# Patient Record
Sex: FEMALE
[De-identification: names, ages, dates, MRNs, and addresses within clinical notes are randomized per-mention and may not be internally consistent; named-entity substitution may affect disease eponyms.]

---

## 2018-01-17 ENCOUNTER — HOSPITAL ENCOUNTER (INPATIENT)
Dept: HOSPITAL 31 - C.ER | Age: 28
LOS: 1 days | Discharge: HOME | DRG: 814 | End: 2018-01-18
Attending: SURGERY | Admitting: SURGERY
Payer: COMMERCIAL

## 2018-01-17 VITALS — RESPIRATION RATE: 20 BRPM

## 2018-01-17 DIAGNOSIS — R10.31: Primary | ICD-10-CM

## 2018-01-17 LAB
ALBUMIN SERPL-MCNC: 3.9 G/DL (ref 3.5–5)
ALBUMIN SERPL-MCNC: 4.2 G/DL (ref 3.5–5)
ALBUMIN/GLOB SERPL: 1.4 {RATIO} (ref 1–2.1)
ALBUMIN/GLOB SERPL: 1.5 {RATIO} (ref 1–2.1)
ALT SERPL-CCNC: 25 U/L (ref 9–52)
ALT SERPL-CCNC: 31 U/L (ref 9–52)
AST SERPL-CCNC: 37 U/L (ref 14–36)
AST SERPL-CCNC: 38 U/L (ref 14–36)
BASOPHILS # BLD AUTO: 0 K/UL (ref 0–0.2)
BASOPHILS # BLD AUTO: 0.1 K/UL (ref 0–0.2)
BASOPHILS NFR BLD: 0.4 % (ref 0–2)
BASOPHILS NFR BLD: 0.6 % (ref 0–2)
BILIRUB UR-MCNC: NEGATIVE MG/DL
BUN SERPL-MCNC: 11 MG/DL (ref 7–17)
BUN SERPL-MCNC: 12 MG/DL (ref 7–17)
CALCIUM SERPL-MCNC: 8.7 MG/DL (ref 8.6–10.4)
CALCIUM SERPL-MCNC: 8.9 MG/DL (ref 8.6–10.4)
EOSINOPHIL # BLD AUTO: 0.4 K/UL (ref 0–0.7)
EOSINOPHIL # BLD AUTO: 0.4 K/UL (ref 0–0.7)
EOSINOPHIL NFR BLD: 4 % (ref 0–4)
EOSINOPHIL NFR BLD: 4.6 % (ref 0–4)
ERYTHROCYTE [DISTWIDTH] IN BLOOD BY AUTOMATED COUNT: 13.3 % (ref 11.5–14.5)
ERYTHROCYTE [DISTWIDTH] IN BLOOD BY AUTOMATED COUNT: 13.4 % (ref 11.5–14.5)
GFR NON-AFRICAN AMERICAN: > 60
GFR NON-AFRICAN AMERICAN: > 60
GLUCOSE UR STRIP-MCNC: NORMAL MG/DL
HCG,QUALITATIVE URINE: NEGATIVE
HGB BLD-MCNC: 13.1 G/DL (ref 11–16)
HGB BLD-MCNC: 13.3 G/DL (ref 11–16)
LEUKOCYTE ESTERASE UR-ACNC: (no result) LEU/UL
LIPASE: 185 U/L (ref 23–300)
LYMPHOCYTES # BLD AUTO: 2.5 K/UL (ref 1–4.3)
LYMPHOCYTES # BLD AUTO: 2.6 K/UL (ref 1–4.3)
LYMPHOCYTES NFR BLD AUTO: 27.7 % (ref 20–40)
LYMPHOCYTES NFR BLD AUTO: 28.4 % (ref 20–40)
MCH RBC QN AUTO: 28.7 PG (ref 27–31)
MCH RBC QN AUTO: 29 PG (ref 27–31)
MCHC RBC AUTO-ENTMCNC: 34.1 G/DL (ref 33–37)
MCHC RBC AUTO-ENTMCNC: 34.5 G/DL (ref 33–37)
MCV RBC AUTO: 84.1 FL (ref 81–99)
MCV RBC AUTO: 84.2 FL (ref 81–99)
MONOCYTES # BLD: 0.8 K/UL (ref 0–0.8)
MONOCYTES # BLD: 0.8 K/UL (ref 0–0.8)
MONOCYTES NFR BLD: 8.3 % (ref 0–10)
MONOCYTES NFR BLD: 8.7 % (ref 0–10)
NEUTROPHILS # BLD: 5.1 K/UL (ref 1.8–7)
NEUTROPHILS # BLD: 5.6 K/UL (ref 1.8–7)
NEUTROPHILS NFR BLD AUTO: 58.3 % (ref 50–75)
NEUTROPHILS NFR BLD AUTO: 59 % (ref 50–75)
NRBC BLD AUTO-RTO: 0 % (ref 0–2)
NRBC BLD AUTO-RTO: 0 % (ref 0–2)
PH UR STRIP: 8 [PH] (ref 5–8)
PLATELET # BLD: 280 K/UL (ref 130–400)
PLATELET # BLD: 301 K/UL (ref 130–400)
PMV BLD AUTO: 8.9 FL (ref 7.2–11.7)
PMV BLD AUTO: 9.1 FL (ref 7.2–11.7)
PROT UR STRIP-MCNC: NEGATIVE MG/DL
RBC # BLD AUTO: 4.56 MIL/UL (ref 3.8–5.2)
RBC # BLD AUTO: 4.59 MIL/UL (ref 3.8–5.2)
RBC # UR STRIP: NEGATIVE /UL
SP GR UR STRIP: 1.01 (ref 1–1.03)
SQUAMOUS EPITHIAL: 1 /HPF (ref 0–5)
URINE AMORPHOUS SEDIMENT: (no result) /UL
URINE NITRATE: NEGATIVE
UROBILINOGEN UR-MCNC: NORMAL MG/DL (ref 0.2–1)
WBC # BLD AUTO: 8.7 K/UL (ref 4.8–10.8)
WBC # BLD AUTO: 9.4 K/UL (ref 4.8–10.8)

## 2018-01-17 NOTE — CP.PCM.HP
History of Present Illness





- History of Present Illness


History of Present Illness: 


General Surgery H&P





HPI: 27F presented to ED with 3days of RLQ abd pain. She has never had this 

pain before. Pain is constant and sharp. + Nausea and NBNB emesis and 

subjective fever. Last BM yesterday was normal. Currently pain is somewhat 

improved. No other complaints.





PMH: Denies


PSH:  x2


SH: Deniestobacco, EtOH, and drug use


All: NKDA


Meds: Denies





Present on Admission





- Present on Admission


Any Indicators Present on Admission: No





Review of Systems





- Review of Systems


All systems: reviewed and no additional remarkable complaints except (as per HPI

)





Past Patient History





- Past Social History


Smoking Status: Never Smoked





- PSYCHIATRIC


Hx Substance Use: No





- SURGICAL HISTORY


Hx Surgeries: Yes


Hx  Section: Yes





- ANESTHESIA


Hx Anesthesia: Yes


Hx Anesthesia Reactions: No


Hx Malignant Hyperthermia: No





Meds


Allergies/Adverse Reactions: 


 Allergies











Allergy/AdvReac Type Severity Reaction Status Date / Time


 


No Known Allergies Allergy   Verified 18 08:30














Physical Exam





- Constitutional


Appears: Non-toxic, No Acute Distress





- Head Exam


Head Exam: ATRAUMATIC, NORMOCEPHALIC





- Eye Exam


Eye Exam: EOMI.  absent: Scleral icterus





- ENT Exam


ENT Exam: Mucous Membranes Moist


Additional comments: 





trachea midline





- Respiratory Exam


Respiratory Exam: NORMAL BREATHING PATTERN.  absent: Respiratory Distress





- Cardiovascular Exam


Cardiovascular Exam: RRR, +S1, +S2





- GI/Abdominal Exam


GI & Abdominal Exam: Guarding (RLQ), Soft, Tenderness (in RLQ).  absent: 

Distended, Firm, Mass, Rebound, Rigid





- Rectal Exam


Rectal Exam: Deferred





- Extremities Exam


Extremities exam: Positive for: normal capillary refill, pedal pulses present.  

Negative for: calf tenderness





- Back Exam


Back exam: CVA tenderness (L), CVA tenderness (R)





- Neurological Exam


Neurological exam: Alert, Oriented x3





- Psychiatric Exam


Psychiatric exam: Normal Affect, Normal Mood





- Skin


Skin Exam: Dry, Warm





Results





- Vital Signs


Recent Vital Signs: 





 Last Vital Signs











Temp  97.4 F L  18 16:40


 


Pulse  74   18 16:40


 


Resp  20   18 16:40


 


BP  113/71   18 16:40


 


Pulse Ox  98   18 16:40














- Labs


Result Diagrams: 


 18 09:32





 18 09:32


Labs: 





 Laboratory Results - last 24 hr











  18





  08:55 09:32 09:32


 


WBC   8.7 


 


RBC   4.59 


 


Hgb   13.3 


 


Hct   38.6 


 


MCV   84.1 


 


MCH   29.0 


 


MCHC   34.5 


 


RDW   13.3 


 


Plt Count   280 


 


MPV   9.1 


 


Neut % (Auto)   58.3 


 


Lymph % (Auto)   28.4 


 


Mono % (Auto)   8.7 


 


Eos % (Auto)   4.0 


 


Baso % (Auto)   0.6 


 


Neut #   5.1 


 


Lymph #   2.5 


 


Mono #   0.8 


 


Eos #   0.4 


 


Baso #   0.1 


 


Sodium    138


 


Potassium    3.9


 


Chloride    100


 


Carbon Dioxide    28


 


Anion Gap    14


 


BUN    12


 


Creatinine    0.6 L


 


Est GFR ( Amer)    > 60


 


Est GFR (Non-Af Amer)    > 60


 


Random Glucose    85


 


Calcium    8.9


 


Total Bilirubin    0.6


 


AST    38 H


 


ALT    25


 


Alkaline Phosphatase    73


 


Total Protein    7.3


 


Albumin    4.2


 


Globulin    3.0


 


Albumin/Globulin Ratio    1.4


 


Lipase    185


 


Urine Color  Yellow  


 


Urine Clarity  Clear  


 


Urine pH  8.0  


 


Ur Specific Gravity  1.006  


 


Urine Protein  Negative  


 


Urine Glucose (UA)  Normal  


 


Urine Ketones  Negative  


 


Urine Blood  Negative  


 


Urine Nitrate  Negative  


 


Urine Bilirubin  Negative  


 


Urine Urobilinogen  Normal  


 


Ur Leukocyte Esterase  Neg  


 


Urine WBC (Auto)  < 1  


 


Urine RBC (Auto)  < 1  


 


Ur Squamous Epith Cells  1  


 


Amorphous Sediment  Rare H  


 


Urine HCG, Qual  Negative  














- Imaging and Cardiology


  ** CT scan - abdomen


Status: Image reviewed by me, Report reviewed by me





Assessment & Plan





- Assessment and Plan (Free Text)


Assessment: 


27F with viral infection vs appendicitis vs constipation


Plan: 


F/U US results


Evening and AM labs


If worse in AM will get CT with oral contrast


D/W Dr. Jewels Kim PGY4

## 2018-01-17 NOTE — C.PDOC
History Of Present Illness


26 y/o female presents to ED with complaints of abdominal pain for 3 days with 

associated vomiting. Patient states "since Monday not feeling well" and denies 

fever, chills, diarrhea, back pain, dysuria or any other complaints at this 

time. 


Time Seen by Provider: 01/17/18 08:32


Chief Complaint (Nursing): Abdominal Pain


History Per: Patient


History/Exam Limitations: no limitations


Onset/Duration Of Symptoms: Days


Current Symptoms Are (Timing): Still Present


Location Of Pain/Discomfort: RLQ





Past Medical History


Reviewed: Historical Data, Nursing Documentation, Vital Signs


Vital Signs: 


 Last Vital Signs











Temp  97.4 F L  01/17/18 16:40


 


Pulse  74   01/17/18 16:40


 


Resp  20   01/17/18 16:40


 


BP  113/71   01/17/18 16:40


 


Pulse Ox  98   01/17/18 16:40














- Medical History


PMH: No Chronic Diseases


Surgical History: No Surg Hx


Family History: States: No Known Family Hx





- Social History


Hx Alcohol Use: No


Hx Substance Use: No





- Immunization History


Hx Tetanus Toxoid Vaccination: No


Hx Influenza Vaccination: No


Hx Pneumococcal Vaccination: No





Review Of Systems


Constitutional: Negative for: Fever, Chills


Gastrointestinal: Positive for: Vomiting, Abdominal Pain.  Negative for: 

Diarrhea


Genitourinary: Negative for: Dysuria


Musculoskeletal: Negative for: Back Pain


Skin: Negative for: Rash


Neurological: Negative for: Weakness, Numbness





Physical Exam





- Physical Exam


Appears: Non-toxic, No Acute Distress


Skin: Normal Color, Warm, Dry, No Rash


Head: Atraumatic, Normacephalic


Oral Mucosa: Moist


Neck: Normal ROM, Supple


Cardiovascular: Rhythm Regular


Respiratory: Normal Breath Sounds, No Rales, No Rhonchi, No Wheezing


Gastrointestinal/Abdominal: Soft, Tenderness (RLQ), No Guarding, No Rebound


Back: No CVA Tenderness


Extremity: Normal ROM, Capillary Refill (<2 seconds)


Neurological/Psych: Oriented x3





ED Course And Treatment





- Laboratory Results


Result Diagrams: 


 01/17/18 09:32





 01/17/18 09:32


Lab Interpretation: No Acute Changes


Urine Pregnancy POC: Negative


O2 Sat by Pulse Oximetry: 100 (RA)


Pulse Ox Interpretation: Normal





- CT Scan/US


  ** No standard instances


Other Rad Studies (CT/US): Read By Radiologist, Radiology Report Reviewed


CT/US Interpretation: FINDINGS:  There is limited evaluation of the solid 

organs without the administration of IV contrast.  LOWER THORAX:  No visible 

consolidation, pleural effusion, or pneumothorax.  LIVER:  Unremarkable 

unenhanced appearance.  GALLBLADDER AND BILE DUCTS:  Unremarkable unenhanced 

appearance.  PANCREAS:  Unremarkable unenhanced appearance.  SPLEEN:  

Unremarkable unenhanced appearance.  ADRENALS:  Unremarkable unenhanced 

appearance.  KIDNEYS AND URETERS:  No hydronephrosis or obstructing renal 

calculus.  BLADDER:  The urinary bladder appears unremarkable.  REPRODUCTIVE:  

Uterus is present.  APPENDIX:  Inflammatory stranding is noted within the right 

lower quadrant.  Suspected appendix appears prominent however this is not well 

delineated due to lack of oral contrast as well as configuration of this 

tubular structure.  BOWEL:  The stomach is nondistended.  Lack of oral contrast 

limits evaluation for bowel pathology.   The bowel loops appear within normal 

limits of caliber without evidence of intestinal obstruction. Trace fluid in 

the left pericolic gutter.  PERITONEUM:  No significant free fluid. No definite 

free air.  LYMPH NODES:  No bulky lymphadenopathy identified.  VASCULATURE:  No 

aortic aneurysm.  BONES:  No acute osseous abnormality is detected.  OTHER 

FINDINGS:  None.  IMPRESSION:  Lack of oral contrast limits evaluation.  

Inflammatory stranding is noted within the right lower quadrant.  Suspected 

appendix appears prominent however this is not well delineated due to lack of 

oral contrast as well as configuration of this tubular structure.  Correlate 

clinically including physical exam and white blood cell count for acute 

appendicitis.  Trace fluid in the left pericolic gutter.


Progress Note: Treated with IVF NSS and tordol.  Case discussed with surgical 

resident who evaluated patient


Reassessment Condition: Unchanged





- Physician Consult Information


Physician Contacted: Los Donis


Outcome Of Conversation: admit





Disposition


Discussed With Dr.: Los Donis


Doctor Will See Patient In The: Hospital





- Disposition


Disposition: HOSPITALIZED


Disposition Time: 14:00


Condition: STABLE





- POA


Present On Arrival: None





- Clinical Impression


Clinical Impression: 


 Abdominal pain, Appendicitis








- PA / NP / Resident Statement


MD/DO has reviewed & agrees with the documentation as recorded.





- Scribe Statement


The provider has reviewed the documentation as recorded by the Cesar Anderson





All medical record entries made by the Cesar were at my direction and 

personally dictated by me. I have reviewed the chart and agree that the record 

accurately reflects my personal performance of the history, physical exam, 

medical decision making, and the department course for this patient. I have 

also personally directed, reviewed, and agree with the discharge instructions 

and disposition.





Decision To Admit





- Pt Status Changed To:


Hospital Disposition Of: Inpatient





- Admit Certification


Admit to Inpatient:: After my assessment, the patient will require 

hospitalization for at least two midnights.  This is because of the severity of 

symptoms shown, intensity of services needed, and/or the medical risk in this 

patient being treated as an outpatient.





- InPatient:


Physician Admission Certification:: appendicitis





- .


Bed Request Type: Regular


Admitting Physician: Los Donis


Patient Diagnosis: 


 Abdominal pain, Appendicitis

## 2018-01-17 NOTE — CT
PROCEDURE:  CT Abdomen and Pelvis without Oral or IV contrast.



HISTORY:

Pain



COMPARISON:

None available.



TECHNIQUE:

Contiguous axial images of the abdomen and pelvis. No oral or IV 

contrast administered. Coronal and Sagittal reformats generated. 



Radiation dose:



Total exam DLP = 378.51 mGy-cm.



This CT exam was performed using one or more of the following dose 

reduction techniques: Automated exposure control, adjustment of the 

mA and/or kV according to patient size, and/or use of iterative 

reconstruction technique.



FINDINGS:

There is limited evaluation of the solid organs without the 

administration of IV contrast.



LOWER THORAX:

No visible consolidation, pleural effusion, or pneumothorax.



LIVER:

Unremarkable unenhanced appearance.



GALLBLADDER AND BILE DUCTS:

Unremarkable unenhanced appearance.



PANCREAS:

Unremarkable unenhanced appearance.



SPLEEN:

Unremarkable unenhanced appearance.



ADRENALS:

Unremarkable unenhanced appearance.



KIDNEYS AND URETERS:

No hydronephrosis or obstructing renal calculus. 



BLADDER:

The urinary bladder appears unremarkable.



REPRODUCTIVE:

Uterus is present. 



APPENDIX:

Inflammatory stranding is noted within the right lower quadrant.  

Suspected appendix appears prominent however this is not well 

delineated due to lack of oral contrast as well as configuration of 

this tubular structure.



BOWEL:

The stomach is nondistended. 



Lack of oral contrast limits evaluation for bowel pathology.   The 

bowel loops appear within normal limits of caliber without evidence 

of intestinal obstruction. Trace fluid in the left pericolic gutter. 



PERITONEUM:

No significant free fluid. No definite free air.



LYMPH NODES:

No bulky lymphadenopathy identified.



VASCULATURE:

No aortic aneurysm. 



BONES:

No acute osseous abnormality is detected.



OTHER FINDINGS:

None. 



IMPRESSION:

Lack of oral contrast limits evaluation.



Inflammatory stranding is noted within the right lower quadrant.  

Suspected appendix appears prominent however this is not well 

delineated due to lack of oral contrast as well as configuration of 

this tubular structure.  Correlate clinically including physical exam 

and white blood cell count for acute appendicitis. 



Trace fluid in the left pericolic gutter.

## 2018-01-17 NOTE — US
Indication: Possible appendicitis 



Right lower quadrant limited ultrasound 



Comparison: CT of the abdomen pelvis without contrast performed 

1/17/18 



Findings: 



Tubular structure measuring approximately 3.4 x 0.7 x 1.3 cm in the 

right lower quadrant appears noncompressible, possibly appendix. No 

associated hypervascularity appreciated.  Small pelvic free fluid 

noted in the right lower quadrant. 



Impression: 



Tubular structure measuring approximately 3.4 x 0.7 x 1.3 cm in the 

right lower quadrant appears noncompressible, possibly appendix. No 

associated hypervascularity appreciated.  Small pelvic free fluid 

noted in the right lower quadrant. Correlate clinically for 

possibility of appendicitis.

## 2018-01-18 VITALS
SYSTOLIC BLOOD PRESSURE: 100 MMHG | OXYGEN SATURATION: 100 % | HEART RATE: 85 BPM | TEMPERATURE: 98.2 F | DIASTOLIC BLOOD PRESSURE: 62 MMHG

## 2018-01-18 LAB
ALBUMIN SERPL-MCNC: 3.5 G/DL (ref 3.5–5)
ALBUMIN/GLOB SERPL: 1.5 {RATIO} (ref 1–2.1)
ALT SERPL-CCNC: 26 U/L (ref 9–52)
AST SERPL-CCNC: 36 U/L (ref 14–36)
BASOPHILS # BLD AUTO: 0.1 K/UL (ref 0–0.2)
BASOPHILS NFR BLD: 0.8 % (ref 0–2)
BUN SERPL-MCNC: 11 MG/DL (ref 7–17)
CALCIUM SERPL-MCNC: 8.1 MG/DL (ref 8.6–10.4)
EOSINOPHIL # BLD AUTO: 0.4 K/UL (ref 0–0.7)
EOSINOPHIL NFR BLD: 5.3 % (ref 0–4)
ERYTHROCYTE [DISTWIDTH] IN BLOOD BY AUTOMATED COUNT: 13.7 % (ref 11.5–14.5)
GFR NON-AFRICAN AMERICAN: > 60
HGB BLD-MCNC: 12.9 G/DL (ref 11–16)
LYMPHOCYTES # BLD AUTO: 2 K/UL (ref 1–4.3)
LYMPHOCYTES NFR BLD AUTO: 28.9 % (ref 20–40)
MCH RBC QN AUTO: 28.9 PG (ref 27–31)
MCHC RBC AUTO-ENTMCNC: 34.1 G/DL (ref 33–37)
MCV RBC AUTO: 84.7 FL (ref 81–99)
MONOCYTES # BLD: 0.6 K/UL (ref 0–0.8)
MONOCYTES NFR BLD: 8.8 % (ref 0–10)
NEUTROPHILS # BLD: 3.9 K/UL (ref 1.8–7)
NEUTROPHILS NFR BLD AUTO: 56.2 % (ref 50–75)
NRBC BLD AUTO-RTO: 0.1 % (ref 0–2)
PLATELET # BLD: 268 K/UL (ref 130–400)
PMV BLD AUTO: 9.1 FL (ref 7.2–11.7)
RBC # BLD AUTO: 4.48 MIL/UL (ref 3.8–5.2)
WBC # BLD AUTO: 6.9 K/UL (ref 4.8–10.8)

## 2018-01-18 NOTE — CP.PCM.PN
Subjective





- Date & Time of Evaluation


Date of Evaluation: 01/18/18


Time of Evaluation: 09:34





- Subjective


Subjective: 





Surgery 





Pt s&e. NAEON. Pain controlled. Denies F/C/n/V/D/Cp/SOB. + void. 





Objective





- Vital Signs/Intake and Output


Vital Signs (last 24 hours): 


 











Temp Pulse Resp BP Pulse Ox


 


 98.3 F   71   20   96/54 L  98 


 


 01/18/18 08:16  01/18/18 08:16  01/18/18 08:16  01/18/18 08:16  01/18/18 08:16








Intake and Output: 


 











 01/18/18 01/18/18





 06:59 18:59


 


Intake Total 440 880


 


Output Total 200 


 


Balance 240 880














- Medications


Medications: 


 Current Medications





Sodium Chloride (Sodium Chloride 0.9%)  1,000 mls @ 110 mls/hr IV .[id number] JAYLEN


   Last Admin: 01/18/18 08:12 Dose:  Not Given


Morphine Sulfate (Morphine)  2 mg IVP Q4H PRN


   PRN Reason: Pain, moderate (4-7)


Morphine Sulfate (Morphine)  4 mg IVP Q4H PRN


   PRN Reason: Pain, severe (8-10)


Ondansetron HCl (Zofran Inj)  4 mg IVP Q4 PRN


   PRN Reason: Nausea/Vomiting











- Labs


Labs: 


 





 01/18/18 06:39 





 01/18/18 06:39 











- Constitutional


Appears: No Acute Distress





- Head Exam


Head Exam: ATRAUMATIC, NORMAL INSPECTION, NORMOCEPHALIC





- Eye Exam


Eye Exam: EOMI, Normal appearance, PERRL


Pupil Exam: NORMAL ACCOMODATION, PERRL





- ENT Exam


ENT Exam: Mucous Membranes Moist, Normal Exam





- Neck Exam


Neck Exam: Full ROM, Normal Inspection.  absent: Lymphadenopathy





- Respiratory Exam


Respiratory Exam: Clear to Ausculation Bilateral, NORMAL BREATHING PATTERN





- Cardiovascular Exam


Cardiovascular Exam: REGULAR RHYTHM, +S1, +S2.  absent: Murmur





- GI/Abdominal Exam


GI & Abdominal Exam: Soft, Tenderness, Normal Bowel Sounds.  absent: Distended, 

Firm, Guarding, Rigid, Rebound


Additional comments: 





RLQ TTP. 





-  Exam


 Exam: NORMAL INSPECTION





- Extremities Exam


Extremities Exam: Full ROM, Normal Capillary Refill, Normal Inspection.  absent

: Joint Swelling, Pedal Edema





- Back Exam


Back Exam: NORMAL INSPECTION





- Neurological Exam


Neurological Exam: Alert, Awake, CN II-XII Intact, Normal Gait, Oriented x3





- Psychiatric Exam


Psychiatric exam: Normal Affect, Normal Mood





- Skin


Skin Exam: Dry, Intact, Normal Color, Warm





Assessment and Plan





- Assessment and Plan (Free Text)


Assessment: 





27F with RLQ pain


US: 3.5 x 0.7 cm appendix


CT RLQ fat stranding and pelvic fluids





Plan: 





If worse, will get CT with oral contrast


Poss OR 


GYn consult





D/W Dr. Donis